# Patient Record
Sex: MALE | Race: WHITE
[De-identification: names, ages, dates, MRNs, and addresses within clinical notes are randomized per-mention and may not be internally consistent; named-entity substitution may affect disease eponyms.]

---

## 2020-03-24 ENCOUNTER — HOSPITAL ENCOUNTER (OUTPATIENT)
Dept: HOSPITAL 53 - M WUC | Age: 50
End: 2020-03-24
Attending: PHYSICIAN ASSISTANT
Payer: COMMERCIAL

## 2020-03-24 DIAGNOSIS — H53.8: Primary | ICD-10-CM

## 2020-03-24 LAB
ALBUMIN SERPL BCG-MCNC: 4 GM/DL (ref 3.2–5.2)
ALT SERPL W P-5'-P-CCNC: 42 U/L (ref 12–78)
BASOPHILS # BLD AUTO: 0 10^3/UL (ref 0–0.2)
BASOPHILS NFR BLD AUTO: 0.2 % (ref 0–1)
BILIRUB SERPL-MCNC: 0.7 MG/DL (ref 0.2–1)
BUN SERPL-MCNC: 10 MG/DL (ref 7–18)
CALCIUM SERPL-MCNC: 9.2 MG/DL (ref 8.5–10.1)
CHLORIDE SERPL-SCNC: 108 MEQ/L (ref 98–107)
CO2 SERPL-SCNC: 33 MEQ/L (ref 21–32)
CREAT SERPL-MCNC: 1.11 MG/DL (ref 0.7–1.3)
EOSINOPHIL # BLD AUTO: 0.1 10^3/UL (ref 0–0.5)
EOSINOPHIL NFR BLD AUTO: 1 % (ref 0–3)
ERYTHROCYTE [SEDIMENTATION RATE] IN BLOOD BY WESTERGREN METHOD: 8 MM/HR (ref 0–15)
GFR SERPL CREATININE-BSD FRML MDRD: > 60 ML/MIN/{1.73_M2} (ref 60–?)
GLUCOSE SERPL-MCNC: 95 MG/DL (ref 70–100)
HCT VFR BLD AUTO: 40.6 % (ref 42–52)
HGB BLD-MCNC: 13.7 G/DL (ref 13.5–17.5)
LYMPHOCYTES # BLD AUTO: 1.7 10^3/UL (ref 1.5–5)
LYMPHOCYTES NFR BLD AUTO: 28.3 % (ref 24–44)
MCH RBC QN AUTO: 30.2 PG (ref 27–33)
MCHC RBC AUTO-ENTMCNC: 33.7 G/DL (ref 32–36.5)
MCV RBC AUTO: 89.4 FL (ref 80–96)
MONOCYTES # BLD AUTO: 0.4 10^3/UL (ref 0–0.8)
MONOCYTES NFR BLD AUTO: 7.2 % (ref 0–5)
NEUTROPHILS # BLD AUTO: 3.7 10^3/UL (ref 1.5–8.5)
NEUTROPHILS NFR BLD AUTO: 63 % (ref 36–66)
PLATELET # BLD AUTO: 252 10^3/UL (ref 150–450)
POTASSIUM SERPL-SCNC: 3.5 MEQ/L (ref 3.5–5.1)
PROT SERPL-MCNC: 7 GM/DL (ref 6.4–8.2)
RBC # BLD AUTO: 4.54 10^6/UL (ref 4.3–6.1)
SODIUM SERPL-SCNC: 143 MEQ/L (ref 136–145)
T4 FREE SERPL-MCNC: 0.83 NG/DL (ref 0.76–1.46)
TSH SERPL DL<=0.005 MIU/L-ACNC: 1.6 UIU/ML (ref 0.36–3.74)
WBC # BLD AUTO: 5.8 10^3/UL (ref 4–10)

## 2020-03-25 ENCOUNTER — HOSPITAL ENCOUNTER (OUTPATIENT)
Dept: HOSPITAL 53 - M LAB | Age: 50
End: 2020-03-25
Attending: OPHTHALMOLOGY
Payer: COMMERCIAL

## 2020-03-25 ENCOUNTER — HOSPITAL ENCOUNTER (EMERGENCY)
Dept: HOSPITAL 53 - M ED | Age: 50
Discharge: HOME | End: 2020-03-25
Payer: COMMERCIAL

## 2020-03-25 VITALS — BODY MASS INDEX: 33.77 KG/M2 | HEIGHT: 67 IN | WEIGHT: 215.17 LBS

## 2020-03-25 VITALS — SYSTOLIC BLOOD PRESSURE: 160 MMHG | DIASTOLIC BLOOD PRESSURE: 92 MMHG

## 2020-03-25 DIAGNOSIS — E78.5: ICD-10-CM

## 2020-03-25 DIAGNOSIS — H53.2: Primary | ICD-10-CM

## 2020-03-25 DIAGNOSIS — I10: ICD-10-CM

## 2020-03-25 DIAGNOSIS — E07.9: Primary | ICD-10-CM

## 2020-03-25 DIAGNOSIS — E07.9: ICD-10-CM

## 2020-03-25 DIAGNOSIS — G93.5: ICD-10-CM

## 2020-03-25 DIAGNOSIS — Z79.899: ICD-10-CM

## 2020-03-25 LAB
CRP SERPL-MCNC: < 0.3 MG/DL (ref 0–0.3)
FT4I SERPL CALC-MCNC: 2.9 % (ref 1.4–3.8)
T3RU NFR SERPL: 31 % (ref 33–40)
T4 SERPL-MCNC: 9.2 UG/DL (ref 4.5–12)
TSH SERPL DL<=0.005 MIU/L-ACNC: 2.05 UIU/ML (ref 0.36–3.74)

## 2020-03-25 NOTE — REP
MR angiography the brain without contrast:

 

History:  Binocular diplopia.  Right-sided cranial nerves sixth palsy.

 

Technique:  3-D time-of-flight MR angiography of the brain is acquired in the

usual fashion and maximal intensity projection images were generated in

rotational format about the vertical and horizontal axes.  In addition, source

axial T1-weighted images are viewed in cine mode.

 

MR angiographic findings:  The distal vertebral arteries are patent and

co-dominant.  Basilar artery is a little tortuous but widely patent.  The

posterior cerebral and superior cerebellar vessels are normal and symmetric.  The

distal internal carotid arteries are unremarkable.  Anterior and middle cerebral

arteries appear intact.  There is no visible berry aneurysm or arteriovenous

malformation.

 

Impression:

 

Unremarkable MR angiography the brain.

 

 

Electronically Signed by

Daniel Hernández MD 03/25/2020 08:09 A

## 2020-03-25 NOTE — REP
MRI BRAIN WITHOUT CONTRAST:

 

HISTORY:  Binocular diplopia, right-sided sixth nerve palsy.  No comparison brain

imaging.

 

TECHNIQUE:  Axial and sagittal imaging planes are utilized for T1- and

T2-weighted scans.  Sequences include spin-echo, fast spin echo, FLAIR, and

diffusion weighted sequences.

 

MRI FINDINGS:  Sagittal and axial images demonstrate 14 mm of tonsillar ectopia

consistent with Arnold Chiari type 1 malformation.  There is some crowding at the

foramen magnum although the spinal medullary junction is not visibly compressed.

No kinking is seen.  There is no evidence of hydrocephalous.  No other

malformation is appreciated.  No bony calvarial lesion is seen.  There is no

evidence of significant paranasal sinus disease.  No intraorbital abnormality is

appreciated.  The deep facial and skull base soft tissues are unremarkable.

 

Gray-white differentiation pattern is intact above below the tentorium.

 

Diffusion weighted scans show no evidence of restricted diffusion in the nii or

elsewhere intracranially to suggest acute ischemia.  There is no evidence of

intracranial hemorrhage.  No mass or midline shift is seen.

 

IMPRESSION:

 

13 mm of tonsillar ectopia consistent with Arnold Chiari type 1 malformation.

Some crowding at the level of the foramen magnum but no evidence of kinking or

kriss compression of the spinal medullary junction.  Otherwise negative.

 

 

Electronically Signed by

Daniel Hernández MD 03/25/2020 09:14 A

## 2020-03-26 NOTE — ED PDOC
Post-Departure Follow-Up


tracie spear and s[facility] faxed formal report of mri brain for fu. mlg Lundborg-Gray,Maja MD          Mar 26, 2020 16:11

## 2020-04-02 ENCOUNTER — HOSPITAL ENCOUNTER (OUTPATIENT)
Dept: HOSPITAL 53 - M RAD | Age: 50
End: 2020-04-02
Attending: OPHTHALMOLOGY
Payer: COMMERCIAL

## 2020-04-02 DIAGNOSIS — G83.9: Primary | ICD-10-CM

## 2020-04-10 ENCOUNTER — HOSPITAL ENCOUNTER (OUTPATIENT)
Dept: HOSPITAL 53 - M RAD | Age: 50
End: 2020-04-10
Attending: OPHTHALMOLOGY
Payer: COMMERCIAL

## 2020-04-10 DIAGNOSIS — H53.2: Primary | ICD-10-CM

## 2020-04-10 PROCEDURE — 70553 MRI BRAIN STEM W/O & W/DYE: CPT

## 2020-04-10 PROCEDURE — 70544 MR ANGIOGRAPHY HEAD W/O DYE: CPT

## 2020-04-10 PROCEDURE — 70543 MRI ORBT/FAC/NCK W/O &W/DYE: CPT

## 2020-04-10 NOTE — REPVR
PROCEDURE INFORMATION: 

Exam: MR Orbit Without and With Contrast 

Exam date and time: 4/10/2020 8:59 AM 

Age: 49 years old 

Clinical indication: Visual changes or disturbances; Double vision (diplopia); 

Patient HX: Double vision worse on RT side that is improving priors at other 

facilities unsure where; Additional info: Diplopia, right sided palay 



TECHNIQUE: 

Imaging protocol: MR Orbit was performed without and with intravenous contrast. 

3D rendering: MIP and/or 3D reconstructed images were created by the 

technologist. 

Contrast material: PROHANCE; Contrast volume: 19 ml; Contrast route: 22G;  



COMPARISON: 

No relevant prior studies available. 



FINDINGS: 

Orbits: The globes, extraocular muscles and optic nerves appear symmetric 

bilaterally.

Sinuses: There is mild ethmoid and maxillary sinus mucosal thickening. 

Soft tissues: Unremarkable. 



IMPRESSION: 

No acute abnormality.



Electronically signed by: Summer Carter On 04/10/2020  09:58:25 AM

## 2020-04-10 NOTE — REP
MR angiography the brain without contrast:

 

History:  Focused on ocular motor nucleus.  Right-sided palsy.  Diplopia.

Comparison is made with MR angiography of the brain from March 25, 2020.

 

Technique:  3-D time-of-flight MR angiography of the brain is acquired in the

usual fashion and maximal intensity projection images were generated in

rotational format about the vertical and horizontal axes.  In addition, source

axial T1-weighted images are viewed in cine mode.

 

MR angiographic findings:  The distal vertebral arteries are patent and

co-dominant.  Basilar artery is a little tortuous but widely patent.  The

posterior cerebral and superior cerebellar vessels are normal and symmetric.  The

distal internal carotid arteries are unremarkable.  Anterior and middle cerebral

arteries appear intact.  There is no visible berry aneurysm or arteriovenous

malformation.

 

Impression:

 

Unremarkable MR angiography the brain.

 

 

Electronically Signed by

Daniel Hernández MD 04/10/2020 09:17 A

## 2020-04-10 NOTE — REPVR
PROCEDURE INFORMATION: 

Exam: MR Head Without and With Contrast 

Exam date and time: 4/10/2020 8:59 AM 

Age: 49 years old 

Clinical indication: Visual disturbance; Patient HX: Double vision worse on RT 

side that is improving priors at other facilities unsure where; Additional 

info: Diplopia, right sided palay 



TECHNIQUE: 

Imaging protocol: MR of the head without and with intravenous contrast. 

3D rendering: MIP and/or 3D reconstructed images were created by the 

technologist. 

Contrast material: PROHANCE; Contrast volume: 19 ml; Contrast route: 22G;  

Other technique: MRA is recommended to evaluate for vascular structures. 



COMPARISON: 

MRI-Brain without Contrast 3/25/2020 7:10 AM 



FINDINGS: 

Brain: There is pronounced inferior cerebellar tonsillar ectopia measuring up 

to 15 mm, compatible with a Chiari 1 type malformation. There is no extra-axial 

collection or intra-axial mass. Normal parenchymal signal is preserved. There 

is no diffusion restriction.

Ventricles: The ventricles are normal in size and configuration for age.

Bones/joints: Unremarkable. 

Soft tissues: Unremarkable. 

Sinuses: Normal as visualized. No acute sinusitis. 

Mastoid air cells: Normal as visualized. No mastoid effusion. 

Orbits: Unremarkable. 



IMPRESSION: 

1. No acute intracranial abnormality.

2. Stable Chiari 1 malformation. 



=========================

PROCEDURE INFORMATION: 

Exam: MR Angiogram Head Without Contrast, Arteries 

Exam date and time: 4/10/2020 8:59 AM 

Age: 49 years old 

Clinical indication: Visual disturbance; Patient HX: Double vision worse on RT 

side that is improving priors at other facilities unsure where; Additional 

info: Diplopia, right sided palay 



TECHNIQUE: 

Imaging protocol: MR angiogram head without contrast. Exam focused on the 

arteries. 

Other technique: MRA is recommended to evaluate for vascular structures. 



COMPARISON: 

MRI-Brain without Contrast 3/25/2020 7:10 AM 



FINDINGS: 

Right internal carotid artery: Intracranial segment is patent with no 

significant stenosis. No aneurysm. 

Right anterior cerebral artery: No occlusion or significant stenosis. No 

aneurysm. 

Right middle cerebral artery: No occlusion or significant stenosis. No 

aneurysm. 

Right posterior cerebral artery: No occlusion or significant stenosis. No 

aneurysm. 

Right vertebral artery: No occlusion or significant stenosis. No aneurysm. 



Left internal carotid artery: Intracranial segment is patent with no 

significant stenosis. No aneurysm. 

Left anterior cerebral artery: No occlusion or significant stenosis. No 

aneurysm. 

Left middle cerebral artery: No occlusion or significant stenosis. No aneurysm. 

Left posterior cerebral artery: No occlusion or significant stenosis. No 

aneurysm. 

Left vertebral artery: No occlusion or significant stenosis. No aneurysm. 



Basilar artery: No occlusion or significant stenosis. No aneurysm. 



IMPRESSION: 

No stenosis or occlusion. 



Electronically signed by: Summer Carter On 04/10/2020  10:05:17 AM

## 2020-12-07 ENCOUNTER — HOSPITAL ENCOUNTER (OUTPATIENT)
Dept: HOSPITAL 53 - M LABSMTC | Age: 50
End: 2020-12-07
Attending: PEDIATRICS
Payer: SELF-PAY

## 2020-12-07 DIAGNOSIS — Z20.828: Primary | ICD-10-CM

## 2021-01-09 ENCOUNTER — HOSPITAL ENCOUNTER (OUTPATIENT)
Dept: HOSPITAL 53 - M LABSMTC | Age: 51
End: 2021-01-09
Attending: PEDIATRICS
Payer: SELF-PAY

## 2021-01-09 DIAGNOSIS — Z20.822: Primary | ICD-10-CM

## 2021-04-08 ENCOUNTER — HOSPITAL ENCOUNTER (OUTPATIENT)
Dept: HOSPITAL 53 - M WUC | Age: 51
End: 2021-04-08
Attending: PHYSICIAN ASSISTANT
Payer: COMMERCIAL
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
## 2021-04-08 DIAGNOSIS — S93.401A: Primary | ICD-10-CM

## 2021-04-08 NOTE — REP
INDICATION:

SPRAIN



COMPARISON:

None.



TECHNIQUE:

There are four views.



FINDINGS:

There is no fracture or dislocation.

Mineralization and joint spaces are normal.

There are no calcifications or foreign bodies.



IMPRESSION:

Essentially negative right ankle.





<Electronically signed by James Howard > 04/08/21 [id number]

## 2022-01-26 ENCOUNTER — HOSPITAL ENCOUNTER (OUTPATIENT)
Dept: HOSPITAL 53 - M LABSMTC | Age: 52
End: 2022-01-26
Attending: ANESTHESIOLOGY
Payer: COMMERCIAL

## 2022-01-26 DIAGNOSIS — Z11.52: ICD-10-CM

## 2022-01-26 DIAGNOSIS — Z01.812: Primary | ICD-10-CM

## 2022-01-31 ENCOUNTER — HOSPITAL ENCOUNTER (OUTPATIENT)
Dept: HOSPITAL 53 - M OPP | Age: 52
Discharge: HOME | End: 2022-01-31
Attending: INTERNAL MEDICINE
Payer: OTHER GOVERNMENT

## 2022-01-31 VITALS — SYSTOLIC BLOOD PRESSURE: 136 MMHG | DIASTOLIC BLOOD PRESSURE: 65 MMHG

## 2022-01-31 VITALS — WEIGHT: 224.4 LBS | HEIGHT: 67 IN | BODY MASS INDEX: 35.22 KG/M2

## 2022-01-31 DIAGNOSIS — Z86.73: ICD-10-CM

## 2022-01-31 DIAGNOSIS — K57.30: ICD-10-CM

## 2022-01-31 DIAGNOSIS — K63.5: ICD-10-CM

## 2022-01-31 DIAGNOSIS — Z79.899: ICD-10-CM

## 2022-01-31 DIAGNOSIS — Z87.442: ICD-10-CM

## 2022-01-31 DIAGNOSIS — K64.0: ICD-10-CM

## 2022-01-31 DIAGNOSIS — Z79.82: ICD-10-CM

## 2022-01-31 DIAGNOSIS — Z12.11: Primary | ICD-10-CM

## 2022-05-11 ENCOUNTER — HOSPITAL ENCOUNTER (INPATIENT)
Dept: HOSPITAL 53 - M ED | Age: 52
LOS: 1 days | Discharge: HOME | DRG: 93 | End: 2022-05-12
Attending: INTERNAL MEDICINE | Admitting: INTERNAL MEDICINE
Payer: OTHER GOVERNMENT

## 2022-05-11 VITALS — DIASTOLIC BLOOD PRESSURE: 84 MMHG | SYSTOLIC BLOOD PRESSURE: 150 MMHG

## 2022-05-11 VITALS — WEIGHT: 219.58 LBS | BODY MASS INDEX: 35.29 KG/M2 | HEIGHT: 66 IN

## 2022-05-11 VITALS — DIASTOLIC BLOOD PRESSURE: 67 MMHG | SYSTOLIC BLOOD PRESSURE: 147 MMHG

## 2022-05-11 VITALS — DIASTOLIC BLOOD PRESSURE: 91 MMHG | SYSTOLIC BLOOD PRESSURE: 188 MMHG

## 2022-05-11 DIAGNOSIS — E78.5: ICD-10-CM

## 2022-05-11 DIAGNOSIS — Z20.822: ICD-10-CM

## 2022-05-11 DIAGNOSIS — Z79.899: ICD-10-CM

## 2022-05-11 DIAGNOSIS — I10: ICD-10-CM

## 2022-05-11 DIAGNOSIS — Z79.82: ICD-10-CM

## 2022-05-11 DIAGNOSIS — F17.220: ICD-10-CM

## 2022-05-11 DIAGNOSIS — H49.02: ICD-10-CM

## 2022-05-11 DIAGNOSIS — Q07.00: Primary | ICD-10-CM

## 2022-05-11 DIAGNOSIS — H53.2: ICD-10-CM

## 2022-05-11 LAB
APTT BLD: 32.4 SECONDS (ref 25.9–37)
BASOPHILS # BLD AUTO: 0 10^3/UL (ref 0–0.2)
BASOPHILS NFR BLD AUTO: 0.4 % (ref 0–1)
CK MB CFR.DF SERPL CALC: 0.62
CK MB SERPL-MCNC: 1.9 NG/ML (ref ?–3.6)
CK SERPL-CCNC: 305 U/L (ref 39–308)
EOSINOPHIL # BLD AUTO: 0.1 10^3/UL (ref 0–0.5)
EOSINOPHIL NFR BLD AUTO: 2.3 % (ref 0–3)
HCT VFR BLD AUTO: 42.8 % (ref 42–52)
HGB BLD-MCNC: 14.6 G/DL (ref 13.5–17.5)
INR PPP: 0.9
LYMPHOCYTES # BLD AUTO: 1.5 10^3/UL (ref 1.5–5)
LYMPHOCYTES NFR BLD AUTO: 28.1 % (ref 24–44)
MCH RBC QN AUTO: 30 PG (ref 27–33)
MCHC RBC AUTO-ENTMCNC: 34.1 G/DL (ref 32–36.5)
MCV RBC AUTO: 88.1 FL (ref 80–96)
MONOCYTES # BLD AUTO: 0.5 10^3/UL (ref 0–0.8)
MONOCYTES NFR BLD AUTO: 8.8 % (ref 2–8)
NEUTROPHILS # BLD AUTO: 3.2 10^3/UL (ref 1.5–8.5)
NEUTROPHILS NFR BLD AUTO: 60.2 % (ref 36–66)
PLATELET # BLD AUTO: 272 10^3/UL (ref 150–450)
PROTHROMBIN TIME: 12.5 SECONDS (ref 12.7–14.5)
RBC # BLD AUTO: 4.86 10^6/UL (ref 4.3–6.1)
RSV RNA NPH QL NAA+PROBE: NEGATIVE
WBC # BLD AUTO: 5.2 10^3/UL (ref 4–10)

## 2022-05-12 VITALS — SYSTOLIC BLOOD PRESSURE: 130 MMHG | DIASTOLIC BLOOD PRESSURE: 81 MMHG

## 2022-05-12 VITALS — SYSTOLIC BLOOD PRESSURE: 117 MMHG | DIASTOLIC BLOOD PRESSURE: 71 MMHG

## 2022-05-12 VITALS — DIASTOLIC BLOOD PRESSURE: 72 MMHG | SYSTOLIC BLOOD PRESSURE: 143 MMHG

## 2022-05-12 LAB
BUN SERPL-MCNC: 13 MG/DL (ref 7–18)
CALCIUM SERPL-MCNC: 8.7 MG/DL (ref 8.5–10.1)
CHLORIDE SERPL-SCNC: 109 MEQ/L (ref 98–107)
CHOLEST SERPL-MCNC: 112 MG/DL (ref ?–200)
CHOLEST/HDLC SERPL: 3.29 {RATIO} (ref ?–5)
CO2 SERPL-SCNC: 31 MEQ/L (ref 21–32)
CREAT SERPL-MCNC: 1.18 MG/DL (ref 0.7–1.3)
EST. AVERAGE GLUCOSE BLD GHB EST-MCNC: 108 MG/DL (ref 60–110)
GFR SERPL CREATININE-BSD FRML MDRD: > 60 ML/MIN/{1.73_M2} (ref 56–?)
GLUCOSE SERPL-MCNC: 101 MG/DL (ref 70–100)
HCT VFR BLD AUTO: 39.1 % (ref 42–52)
HDLC SERPL-MCNC: 34 MG/DL (ref 40–?)
HGB BLD-MCNC: 13.5 G/DL (ref 13.5–17.5)
LDLC SERPL CALC-MCNC: 48 MG/DL (ref ?–100)
MCH RBC QN AUTO: 30.3 PG (ref 27–33)
MCHC RBC AUTO-ENTMCNC: 34.5 G/DL (ref 32–36.5)
MCV RBC AUTO: 87.7 FL (ref 80–96)
NONHDLC SERPL-MCNC: 78 MG/DL
PLATELET # BLD AUTO: 255 10^3/UL (ref 150–450)
POTASSIUM SERPL-SCNC: 4 MEQ/L (ref 3.5–5.1)
RBC # BLD AUTO: 4.46 10^6/UL (ref 4.3–6.1)
SODIUM SERPL-SCNC: 143 MEQ/L (ref 136–145)
TRIGL SERPL-MCNC: 152 MG/DL (ref ?–150)
WBC # BLD AUTO: 5.8 10^3/UL (ref 4–10)